# Patient Record
Sex: FEMALE | Race: WHITE | NOT HISPANIC OR LATINO | URBAN - METROPOLITAN AREA
[De-identification: names, ages, dates, MRNs, and addresses within clinical notes are randomized per-mention and may not be internally consistent; named-entity substitution may affect disease eponyms.]

---

## 2018-09-11 ENCOUNTER — EMERGENCY (EMERGENCY)
Facility: HOSPITAL | Age: 83
LOS: 1 days | Discharge: HOME | End: 2018-09-11
Attending: STUDENT IN AN ORGANIZED HEALTH CARE EDUCATION/TRAINING PROGRAM
Payer: MEDICARE

## 2018-09-11 VITALS
OXYGEN SATURATION: 100 % | WEIGHT: 145.95 LBS | RESPIRATION RATE: 17 BRPM | SYSTOLIC BLOOD PRESSURE: 107 MMHG | DIASTOLIC BLOOD PRESSURE: 73 MMHG | TEMPERATURE: 98 F | HEART RATE: 78 BPM | HEIGHT: 66 IN

## 2018-09-11 VITALS
OXYGEN SATURATION: 100 % | TEMPERATURE: 98 F | SYSTOLIC BLOOD PRESSURE: 143 MMHG | DIASTOLIC BLOOD PRESSURE: 70 MMHG | RESPIRATION RATE: 17 BRPM | HEART RATE: 63 BPM

## 2018-09-11 LAB
ALBUMIN SERPL ELPH-MCNC: 3.8 G/DL — SIGNIFICANT CHANGE UP (ref 3.5–5)
ALP SERPL-CCNC: 99 U/L — SIGNIFICANT CHANGE UP (ref 40–120)
ALT FLD-CCNC: 17 U/L DA — SIGNIFICANT CHANGE UP (ref 10–60)
ANION GAP SERPL CALC-SCNC: 9 MMOL/L — SIGNIFICANT CHANGE UP (ref 5–17)
APPEARANCE UR: CLEAR — SIGNIFICANT CHANGE UP
AST SERPL-CCNC: 17 U/L — SIGNIFICANT CHANGE UP (ref 10–40)
BILIRUB SERPL-MCNC: 0.6 MG/DL — SIGNIFICANT CHANGE UP (ref 0.2–1.2)
BILIRUB UR-MCNC: NEGATIVE — SIGNIFICANT CHANGE UP
BUN SERPL-MCNC: 17 MG/DL — SIGNIFICANT CHANGE UP (ref 7–18)
CALCIUM SERPL-MCNC: 9.3 MG/DL — SIGNIFICANT CHANGE UP (ref 8.4–10.5)
CHLORIDE SERPL-SCNC: 107 MMOL/L — SIGNIFICANT CHANGE UP (ref 96–108)
CO2 SERPL-SCNC: 27 MMOL/L — SIGNIFICANT CHANGE UP (ref 22–31)
COLOR SPEC: YELLOW — SIGNIFICANT CHANGE UP
CREAT SERPL-MCNC: 0.92 MG/DL — SIGNIFICANT CHANGE UP (ref 0.5–1.3)
DIFF PNL FLD: ABNORMAL
GLUCOSE SERPL-MCNC: 97 MG/DL — SIGNIFICANT CHANGE UP (ref 70–99)
GLUCOSE UR QL: NEGATIVE — SIGNIFICANT CHANGE UP
HCT VFR BLD CALC: 47.4 % — HIGH (ref 34.5–45)
HGB BLD-MCNC: 15.3 G/DL — SIGNIFICANT CHANGE UP (ref 11.5–15.5)
KETONES UR-MCNC: NEGATIVE — SIGNIFICANT CHANGE UP
LEUKOCYTE ESTERASE UR-ACNC: ABNORMAL
MCHC RBC-ENTMCNC: 28.2 PG — SIGNIFICANT CHANGE UP (ref 27–34)
MCHC RBC-ENTMCNC: 32.4 GM/DL — SIGNIFICANT CHANGE UP (ref 32–36)
MCV RBC AUTO: 87.1 FL — SIGNIFICANT CHANGE UP (ref 80–100)
NITRITE UR-MCNC: NEGATIVE — SIGNIFICANT CHANGE UP
PH UR: 8 — SIGNIFICANT CHANGE UP (ref 5–8)
PLATELET # BLD AUTO: 204 K/UL — SIGNIFICANT CHANGE UP (ref 150–400)
POTASSIUM SERPL-MCNC: 4.2 MMOL/L — SIGNIFICANT CHANGE UP (ref 3.5–5.3)
POTASSIUM SERPL-SCNC: 4.2 MMOL/L — SIGNIFICANT CHANGE UP (ref 3.5–5.3)
PROT SERPL-MCNC: 8 G/DL — SIGNIFICANT CHANGE UP (ref 6–8.3)
PROT UR-MCNC: 100
RBC # BLD: 5.44 M/UL — HIGH (ref 3.8–5.2)
RBC # FLD: 13.2 % — SIGNIFICANT CHANGE UP (ref 10.3–14.5)
SODIUM SERPL-SCNC: 143 MMOL/L — SIGNIFICANT CHANGE UP (ref 135–145)
SP GR SPEC: 1.01 — SIGNIFICANT CHANGE UP (ref 1.01–1.02)
TROPONIN I SERPL-MCNC: <0.015 NG/ML — SIGNIFICANT CHANGE UP (ref 0–0.04)
UROBILINOGEN FLD QL: 1
WBC # BLD: 10.9 K/UL — HIGH (ref 3.8–10.5)
WBC # FLD AUTO: 10.9 K/UL — HIGH (ref 3.8–10.5)

## 2018-09-11 PROCEDURE — 81001 URINALYSIS AUTO W/SCOPE: CPT

## 2018-09-11 PROCEDURE — 99284 EMERGENCY DEPT VISIT MOD MDM: CPT

## 2018-09-11 PROCEDURE — 71046 X-RAY EXAM CHEST 2 VIEWS: CPT

## 2018-09-11 PROCEDURE — 93005 ELECTROCARDIOGRAM TRACING: CPT

## 2018-09-11 PROCEDURE — 80053 COMPREHEN METABOLIC PANEL: CPT

## 2018-09-11 PROCEDURE — 71046 X-RAY EXAM CHEST 2 VIEWS: CPT | Mod: 26

## 2018-09-11 PROCEDURE — 85027 COMPLETE CBC AUTOMATED: CPT

## 2018-09-11 PROCEDURE — 70450 CT HEAD/BRAIN W/O DYE: CPT | Mod: 26

## 2018-09-11 PROCEDURE — 84484 ASSAY OF TROPONIN QUANT: CPT

## 2018-09-11 PROCEDURE — 99284 EMERGENCY DEPT VISIT MOD MDM: CPT | Mod: 25

## 2018-09-11 PROCEDURE — 70450 CT HEAD/BRAIN W/O DYE: CPT

## 2018-09-11 RX ORDER — MECLIZINE HCL 12.5 MG
1 TABLET ORAL
Qty: 15 | Refills: 0 | OUTPATIENT
Start: 2018-09-11 | End: 2018-09-15

## 2018-09-11 RX ORDER — SODIUM CHLORIDE 9 MG/ML
500 INJECTION INTRAMUSCULAR; INTRAVENOUS; SUBCUTANEOUS ONCE
Qty: 0 | Refills: 0 | Status: COMPLETED | OUTPATIENT
Start: 2018-09-11 | End: 2018-09-11

## 2018-09-11 RX ORDER — MECLIZINE HCL 12.5 MG
25 TABLET ORAL ONCE
Qty: 0 | Refills: 0 | Status: COMPLETED | OUTPATIENT
Start: 2018-09-11 | End: 2018-09-11

## 2018-09-11 RX ADMIN — Medication 25 MILLIGRAM(S): at 14:30

## 2018-09-11 RX ADMIN — SODIUM CHLORIDE 500 MILLILITER(S): 9 INJECTION INTRAMUSCULAR; INTRAVENOUS; SUBCUTANEOUS at 14:28

## 2018-09-11 NOTE — ED PROVIDER NOTE - PROGRESS NOTE DETAILS
Patient endorses dizziness resolved. ambulated with assitance of patient's son, per son, ambulation baseline. patient and family informed of lab and imaging finding, endorses feeling comfortable to go gome.

## 2018-09-11 NOTE — ED PROVIDER NOTE - MEDICAL DECISION MAKING DETAILS
89 y.o presenting with lightheadedness. otherwise well ppaeirng. no signs of distress. denies cp, sob. will obtain basic lab, ekg, trop, ct head to r.o acs, lyte abnormality, infection vs ich.   fluid hydration and reassess

## 2018-09-11 NOTE — ED ADULT NURSE NOTE - NSIMPLEMENTINTERV_GEN_ALL_ED
Implemented All Fall with Harm Risk Interventions:  [place] to call system. Call bell, personal items and telephone within reach. Instruct patient to call for assistance. Room bathroom lighting operational. Non-slip footwear when patient is off stretcher. Physically safe environment: no spills, clutter or unnecessary equipment. Stretcher in lowest position, wheels locked, appropriate side rails in place. Provide visual cue, wrist band, yellow gown, etc. Monitor gait and stability. Monitor for mental status changes and reorient to person, place, and time. Review medications for side effects contributing to fall risk. Reinforce activity limits and safety measures with patient and family. Provide visual clues: red socks.

## 2018-09-11 NOTE — ED PROVIDER NOTE - NEUROLOGICAL, MLM
Alert and oriented, no focal deficits, no motor or sensory deficits. moving all extremity. normal heel shin, no ataxia.

## 2018-09-11 NOTE — ED PROVIDER NOTE - OBJECTIVE STATEMENT
dizziness 89 y.o presenting with transient lightheadedness this morning. denies cp, sob, n, v, abd pain, fever, chills, headache. Per patient's son, patient ambulating at baseline, with assistance from him. no trauma or fall.

## 2019-01-20 ENCOUNTER — INPATIENT (INPATIENT)
Facility: HOSPITAL | Age: 84
LOS: 2 days | Discharge: HOME | DRG: 603 | End: 2019-01-23
Attending: INTERNAL MEDICINE | Admitting: INTERNAL MEDICINE
Payer: MEDICARE

## 2019-01-20 VITALS
WEIGHT: 141.98 LBS | HEART RATE: 76 BPM | HEIGHT: 65 IN | RESPIRATION RATE: 16 BRPM | OXYGEN SATURATION: 97 % | DIASTOLIC BLOOD PRESSURE: 75 MMHG | TEMPERATURE: 98 F | SYSTOLIC BLOOD PRESSURE: 120 MMHG

## 2019-01-20 DIAGNOSIS — F03.90 UNSPECIFIED DEMENTIA WITHOUT BEHAVIORAL DISTURBANCE: ICD-10-CM

## 2019-01-20 DIAGNOSIS — N60.09 SOLITARY CYST OF UNSPECIFIED BREAST: Chronic | ICD-10-CM

## 2019-01-20 DIAGNOSIS — Z96.659 PRESENCE OF UNSPECIFIED ARTIFICIAL KNEE JOINT: Chronic | ICD-10-CM

## 2019-01-20 DIAGNOSIS — L03.90 CELLULITIS, UNSPECIFIED: ICD-10-CM

## 2019-01-20 DIAGNOSIS — Z29.9 ENCOUNTER FOR PROPHYLACTIC MEASURES, UNSPECIFIED: ICD-10-CM

## 2019-01-20 LAB
ALBUMIN SERPL ELPH-MCNC: 3.4 G/DL — LOW (ref 3.5–5)
ALP SERPL-CCNC: 99 U/L — SIGNIFICANT CHANGE UP (ref 40–120)
ALT FLD-CCNC: 14 U/L DA — SIGNIFICANT CHANGE UP (ref 10–60)
ANION GAP SERPL CALC-SCNC: 13 MMOL/L — SIGNIFICANT CHANGE UP (ref 5–17)
APTT BLD: 30.2 SEC — SIGNIFICANT CHANGE UP (ref 27.5–36.3)
AST SERPL-CCNC: 20 U/L — SIGNIFICANT CHANGE UP (ref 10–40)
BASOPHILS # BLD AUTO: 0.1 K/UL — SIGNIFICANT CHANGE UP (ref 0–0.2)
BASOPHILS NFR BLD AUTO: 0.9 % — SIGNIFICANT CHANGE UP (ref 0–2)
BILIRUB SERPL-MCNC: 0.5 MG/DL — SIGNIFICANT CHANGE UP (ref 0.2–1.2)
BUN SERPL-MCNC: 21 MG/DL — HIGH (ref 7–18)
CALCIUM SERPL-MCNC: 8.6 MG/DL — SIGNIFICANT CHANGE UP (ref 8.4–10.5)
CHLORIDE SERPL-SCNC: 108 MMOL/L — SIGNIFICANT CHANGE UP (ref 96–108)
CO2 SERPL-SCNC: 22 MMOL/L — SIGNIFICANT CHANGE UP (ref 22–31)
CREAT SERPL-MCNC: 0.91 MG/DL — SIGNIFICANT CHANGE UP (ref 0.5–1.3)
CRP SERPL-MCNC: 0.83 MG/DL — HIGH (ref 0–0.4)
EOSINOPHIL # BLD AUTO: 0.1 K/UL — SIGNIFICANT CHANGE UP (ref 0–0.5)
EOSINOPHIL NFR BLD AUTO: 1.5 % — SIGNIFICANT CHANGE UP (ref 0–6)
ERYTHROCYTE [SEDIMENTATION RATE] IN BLOOD: 21 MM/HR — HIGH (ref 0–20)
GLUCOSE SERPL-MCNC: 90 MG/DL — SIGNIFICANT CHANGE UP (ref 70–99)
HCT VFR BLD CALC: 42.1 % — SIGNIFICANT CHANGE UP (ref 34.5–45)
HGB BLD-MCNC: 13.5 G/DL — SIGNIFICANT CHANGE UP (ref 11.5–15.5)
INR BLD: 1.04 RATIO — SIGNIFICANT CHANGE UP (ref 0.88–1.16)
LACTATE SERPL-SCNC: 1 MMOL/L — SIGNIFICANT CHANGE UP (ref 0.7–2)
LYMPHOCYTES # BLD AUTO: 1.2 K/UL — SIGNIFICANT CHANGE UP (ref 1–3.3)
LYMPHOCYTES # BLD AUTO: 14.2 % — SIGNIFICANT CHANGE UP (ref 13–44)
MCHC RBC-ENTMCNC: 28.5 PG — SIGNIFICANT CHANGE UP (ref 27–34)
MCHC RBC-ENTMCNC: 32.1 GM/DL — SIGNIFICANT CHANGE UP (ref 32–36)
MCV RBC AUTO: 88.8 FL — SIGNIFICANT CHANGE UP (ref 80–100)
MONOCYTES # BLD AUTO: 0.4 K/UL — SIGNIFICANT CHANGE UP (ref 0–0.9)
MONOCYTES NFR BLD AUTO: 5.1 % — SIGNIFICANT CHANGE UP (ref 2–14)
NEUTROPHILS # BLD AUTO: 6.9 K/UL — SIGNIFICANT CHANGE UP (ref 1.8–7.4)
NEUTROPHILS NFR BLD AUTO: 78.3 % — HIGH (ref 43–77)
PLATELET # BLD AUTO: 199 K/UL — SIGNIFICANT CHANGE UP (ref 150–400)
POTASSIUM SERPL-MCNC: 3.9 MMOL/L — SIGNIFICANT CHANGE UP (ref 3.5–5.3)
POTASSIUM SERPL-SCNC: 3.9 MMOL/L — SIGNIFICANT CHANGE UP (ref 3.5–5.3)
PROT SERPL-MCNC: 7.5 G/DL — SIGNIFICANT CHANGE UP (ref 6–8.3)
PROTHROM AB SERPL-ACNC: 11.6 SEC — SIGNIFICANT CHANGE UP (ref 10–12.9)
RBC # BLD: 4.74 M/UL — SIGNIFICANT CHANGE UP (ref 3.8–5.2)
RBC # FLD: 12.9 % — SIGNIFICANT CHANGE UP (ref 10.3–14.5)
SODIUM SERPL-SCNC: 143 MMOL/L — SIGNIFICANT CHANGE UP (ref 135–145)
WBC # BLD: 8.7 K/UL — SIGNIFICANT CHANGE UP (ref 3.8–10.5)
WBC # FLD AUTO: 8.7 K/UL — SIGNIFICANT CHANGE UP (ref 3.8–10.5)

## 2019-01-20 PROCEDURE — 71045 X-RAY EXAM CHEST 1 VIEW: CPT | Mod: 26

## 2019-01-20 PROCEDURE — 99285 EMERGENCY DEPT VISIT HI MDM: CPT

## 2019-01-20 PROCEDURE — 73630 X-RAY EXAM OF FOOT: CPT | Mod: 26,RT

## 2019-01-20 RX ORDER — PIPERACILLIN AND TAZOBACTAM 4; .5 G/20ML; G/20ML
3.38 INJECTION, POWDER, LYOPHILIZED, FOR SOLUTION INTRAVENOUS ONCE
Qty: 0 | Refills: 0 | Status: COMPLETED | OUTPATIENT
Start: 2019-01-20 | End: 2019-01-20

## 2019-01-20 RX ORDER — SODIUM CHLORIDE 9 MG/ML
1000 INJECTION INTRAMUSCULAR; INTRAVENOUS; SUBCUTANEOUS
Qty: 0 | Refills: 0 | Status: DISCONTINUED | OUTPATIENT
Start: 2019-01-20 | End: 2019-01-21

## 2019-01-20 RX ORDER — AMPICILLIN SODIUM AND SULBACTAM SODIUM 250; 125 MG/ML; MG/ML
3 INJECTION, POWDER, FOR SUSPENSION INTRAMUSCULAR; INTRAVENOUS EVERY 6 HOURS
Qty: 0 | Refills: 0 | Status: DISCONTINUED | OUTPATIENT
Start: 2019-01-20 | End: 2019-01-23

## 2019-01-20 RX ORDER — ENOXAPARIN SODIUM 100 MG/ML
40 INJECTION SUBCUTANEOUS DAILY
Qty: 0 | Refills: 0 | Status: DISCONTINUED | OUTPATIENT
Start: 2019-01-20 | End: 2019-01-23

## 2019-01-20 RX ADMIN — PIPERACILLIN AND TAZOBACTAM 3.38 GRAM(S): 4; .5 INJECTION, POWDER, LYOPHILIZED, FOR SOLUTION INTRAVENOUS at 19:11

## 2019-01-20 RX ADMIN — PIPERACILLIN AND TAZOBACTAM 200 GRAM(S): 4; .5 INJECTION, POWDER, LYOPHILIZED, FOR SOLUTION INTRAVENOUS at 18:06

## 2019-01-20 RX ADMIN — SODIUM CHLORIDE 70 MILLILITER(S): 9 INJECTION INTRAMUSCULAR; INTRAVENOUS; SUBCUTANEOUS at 23:25

## 2019-01-20 NOTE — H&P ADULT - ASSESSMENT
89 female with PMH of Dementia , HTN , comes in with complaint of increased redness and swelling of right 5th toe. Patient started having these symptoms on friday, and then it kept increasing. Went to her podiatrist and was then was sent on here as her toe started turning black. Denies any pain in toe. No fever , chills , nausea , vomiting , diarrhea or any other ROS. Never had similar symptoms before.   In Ed , BP : 120/ 75 mm hg , HR: 76 , Temp : 98.5 F  Cbc WNL, with left shift , ESR : 21   Cmp WNL   Lactate WNL  Xray negative    Will admit to medicine for Cellulitis. 89 female with PMH of Dementia , HTN ( not on meds) , comes in with complaint of increased redness and swelling of right 5th toe. Patient started having these symptoms on friday, and then it kept increasing. Went to her podiatrist and was then was sent on here as her toe started turning black. Denies any pain in toe. No fever , chills , nausea , vomiting , diarrhea or any other ROS. Never had similar symptoms before.   In Ed , BP : 120/ 75 mm hg , HR: 76 , Temp : 98.5 F  Cbc WNL, with left shift , ESR : 21   Cmp WNL   Lactate WNL  Xray negative    Will admit to medicine for Cellulitis. 89 female with PMH of Dementia , HTN ( not on meds) , comes in with complaint of increased redness and swelling of right 4th toe. Patient started having these symptoms on friday, and then it kept increasing. Went to her podiatrist and was then was sent on here as her toe started turning black. Denies any pain in toe. No fever , chills , nausea , vomiting , diarrhea or any other ROS. Never had similar symptoms before.   In Ed , BP : 120/ 75 mm hg , HR: 76 , Temp : 98.5 F  Cbc WNL, with left shift , ESR : 21   Cmp WNL   Lactate WNL  Xray negative    Will admit to medicine for Cellulitis.

## 2019-01-20 NOTE — H&P ADULT - PROBLEM SELECTOR PLAN 3
IMPROVE VTE Individual Risk Assessment          RISK                                                          Points  [  ] Previous VTE                                                3  [  ] Thrombophilia                                             2  [  ] Lower limb paralysis                                   2        (unable to hold up >15 seconds)    [  ] Current Cancer                                             2         (within 6 months)  [ x ] Immobilization > 24 hrs                              1  [  ] ICU/CCU stay > 24 hours                             1  [  x] Age > 60                                                         1    IMPROVE VTE Score: 2   Levonox for dvt ppx

## 2019-01-20 NOTE — H&P ADULT - HISTORY OF PRESENT ILLNESS
89 female with PMH of Dementia , HTN , comes in with complaint of increased redness and swelling of right 5th toe. Patient started having these symptoms on friday, and then it kept increasing. Went to her podiatrist and was then was sent on here as her toe started turning black. Denies any pain in toe. No fever , chills , nausea , vomiting , diarrhea or any other ROS. Never had similar symptoms before.     In Ed , BP : 120/ 75 mm hg , HR: 76 , Temp : 98.5 F  Cbc WNL, with left shift , ESR : 21   Cmp WNL   Lactate WNL  Xray negative 89 female with PMH of Dementia , HTN , comes in with complaint of increased redness and swelling of right 4th toe. Patient started having these symptoms on friday, and then it kept increasing. Went to her podiatrist and was then was sent on here as her toe started turning black. Denies any pain in toe. No fever , chills , nausea , vomiting , diarrhea or any other ROS. Never had similar symptoms before.     In Ed , BP : 120/ 75 mm hg , HR: 76 , Temp : 98.5 F  Cbc WNL, with left shift , ESR : 21   Cmp WNL   Lactate WNL  Xray negative

## 2019-01-20 NOTE — H&P ADULT - NSHPPHYSICALEXAM_GEN_ALL_CORE
PHYSICAL EXAM:  GENERAL: NAD  HEENT: Normocephalic;  conjunctivae and sclerae clear; dry mucous membranes;   NECK : supple  CHEST/LUNG: Clear to auscultation bilaterally with good air entry   HEART: S1 S2  regular; no murmurs, gallops or rubs  ABDOMEN: Soft, Nontender, Nondistended; Bowel sounds present  EXTREMITIES: no cyanosis; right 4th toe redness , swelling , warmth,  necrosis+; pulses intact  SKIN: warm and dry; no rash  NERVOUS SYSTEM:  Awake and alert; Oriented  to place, person and time ; no new deficits

## 2019-01-20 NOTE — ED PROVIDER NOTE - CALF TENDERNESS
suncentimeter area of necrosis to 4th digit PIP of right foot, surronding erythema, induration, warmth, extremities are warm

## 2019-01-20 NOTE — H&P ADULT - PROBLEM SELECTOR PLAN 1
Comes in with complaints of right redness , swelling , necrosis Comes in with complaints of right toe redness , swelling , necrosis , likely traumatic   Would start IV abx  IVF   Podiatry consult   Will likely need GIA  Will check a1c , Vitamin b12   ESR , CRP not very elevated , less likely Osteo  Xray negative for Osteo  If no improvement , consider MRI

## 2019-01-20 NOTE — ED ADULT NURSE NOTE - NSIMPLEMENTINTERV_GEN_ALL_ED
Implemented All Fall with Harm Risk Interventions:  [place] to call system. Call bell, personal items and telephone within reach. Instruct patient to call for assistance. Room bathroom lighting operational. Non-slip footwear when patient is off stretcher. Physically safe environment: no spills, clutter or unnecessary equipment. Stretcher in lowest position, wheels locked, appropriate side rails in place. Provide visual cue, wrist band, yellow gown, etc. Monitor gait and stability. Monitor for mental status changes and reorient to person, place, and time. Review medications for side effects contributing to fall risk. Reinforce activity limits and safety measures with patient and family. Provide visual clues: red socks.

## 2019-01-20 NOTE — ED PROVIDER NOTE - OBJECTIVE STATEMENT
89 y/o F pt w/ PMHx of HTN, Dementia presents to ED c/o redness, swelling, right foot pain x today. Pt  contact Dr. Pink, and was told to go to hospital. Pt denies trauma. History limited due to dementia. 89 y/o F pt w/ PMHx of HTN, Dementia presents to ED c/o redness, swelling, right foot pain x today. Pt  contacted her podiatrist, and was told to go to hospital. Pt denies trauma. History limited due to dementia.

## 2019-01-20 NOTE — H&P ADULT - NSHPLABSRESULTS_GEN_ALL_CORE
13.5   8.7   )-----------( 199      ( 20 Jan 2019 16:59 )             42.1       01-20    143  |  108  |  21<H>  ----------------------------<  90  3.9   |  22  |  0.91    Ca    8.6      20 Jan 2019 17:29    TPro  7.5  /  Alb  3.4<L>  /  TBili  0.5  /  DBili  x   /  AST  20  /  ALT  14  /  AlkPhos  99  01-20      < from: Xray Foot AP + Lateral + Oblique, Right (01.20.19 @ 17:26) >      HISTORY: Infection      3 views of the right foot show no evidence of acute fracture or cortical   erosion. The jointspaces show hammertoe deformities. Vascular   calcifications are present.    IMPRESSION: No acute bony pathology.    Note - This does not exclude fractures in perfect alignment and   apposition as presence may be indicated at one to three weeks following   callus formation.    Thank you for this referral.    < end of copied text >

## 2019-01-21 LAB
24R-OH-CALCIDIOL SERPL-MCNC: 31.4 NG/ML — SIGNIFICANT CHANGE UP (ref 30–80)
ANION GAP SERPL CALC-SCNC: 10 MMOL/L — SIGNIFICANT CHANGE UP (ref 5–17)
APPEARANCE UR: CLEAR — SIGNIFICANT CHANGE UP
BACTERIA # UR AUTO: ABNORMAL /HPF
BASOPHILS # BLD AUTO: 0.1 K/UL — SIGNIFICANT CHANGE UP (ref 0–0.2)
BASOPHILS NFR BLD AUTO: 1 % — SIGNIFICANT CHANGE UP (ref 0–2)
BILIRUB UR-MCNC: NEGATIVE — SIGNIFICANT CHANGE UP
BUN SERPL-MCNC: 22 MG/DL — HIGH (ref 7–18)
CALCIUM SERPL-MCNC: 8.5 MG/DL — SIGNIFICANT CHANGE UP (ref 8.4–10.5)
CHLORIDE SERPL-SCNC: 109 MMOL/L — HIGH (ref 96–108)
CHOLEST SERPL-MCNC: 190 MG/DL — SIGNIFICANT CHANGE UP (ref 10–199)
CO2 SERPL-SCNC: 26 MMOL/L — SIGNIFICANT CHANGE UP (ref 22–31)
COLOR SPEC: YELLOW — SIGNIFICANT CHANGE UP
COMMENT - URINE: SIGNIFICANT CHANGE UP
CREAT SERPL-MCNC: 0.93 MG/DL — SIGNIFICANT CHANGE UP (ref 0.5–1.3)
DIFF PNL FLD: NEGATIVE — SIGNIFICANT CHANGE UP
EOSINOPHIL # BLD AUTO: 0.2 K/UL — SIGNIFICANT CHANGE UP (ref 0–0.5)
EOSINOPHIL NFR BLD AUTO: 1.9 % — SIGNIFICANT CHANGE UP (ref 0–6)
EPI CELLS # UR: SIGNIFICANT CHANGE UP /HPF
FOLATE SERPL-MCNC: 18.5 NG/ML — SIGNIFICANT CHANGE UP
GLUCOSE SERPL-MCNC: 92 MG/DL — SIGNIFICANT CHANGE UP (ref 70–99)
GLUCOSE UR QL: NEGATIVE — SIGNIFICANT CHANGE UP
HBA1C BLD-MCNC: 5.3 % — SIGNIFICANT CHANGE UP (ref 4–5.6)
HCT VFR BLD CALC: 42.5 % — SIGNIFICANT CHANGE UP (ref 34.5–45)
HDLC SERPL-MCNC: 46 MG/DL — LOW
HGB BLD-MCNC: 13.8 G/DL — SIGNIFICANT CHANGE UP (ref 11.5–15.5)
KETONES UR-MCNC: NEGATIVE — SIGNIFICANT CHANGE UP
LEUKOCYTE ESTERASE UR-ACNC: ABNORMAL
LIPID PNL WITH DIRECT LDL SERPL: 120 MG/DL — SIGNIFICANT CHANGE UP
LYMPHOCYTES # BLD AUTO: 1.3 K/UL — SIGNIFICANT CHANGE UP (ref 1–3.3)
LYMPHOCYTES # BLD AUTO: 15.8 % — SIGNIFICANT CHANGE UP (ref 13–44)
MAGNESIUM SERPL-MCNC: 2 MG/DL — SIGNIFICANT CHANGE UP (ref 1.6–2.6)
MCHC RBC-ENTMCNC: 28.8 PG — SIGNIFICANT CHANGE UP (ref 27–34)
MCHC RBC-ENTMCNC: 32.3 GM/DL — SIGNIFICANT CHANGE UP (ref 32–36)
MCV RBC AUTO: 89 FL — SIGNIFICANT CHANGE UP (ref 80–100)
MONOCYTES # BLD AUTO: 0.5 K/UL — SIGNIFICANT CHANGE UP (ref 0–0.9)
MONOCYTES NFR BLD AUTO: 5.9 % — SIGNIFICANT CHANGE UP (ref 2–14)
NEUTROPHILS # BLD AUTO: 6.2 K/UL — SIGNIFICANT CHANGE UP (ref 1.8–7.4)
NEUTROPHILS NFR BLD AUTO: 75.5 % — SIGNIFICANT CHANGE UP (ref 43–77)
NITRITE UR-MCNC: NEGATIVE — SIGNIFICANT CHANGE UP
PH UR: 7 — SIGNIFICANT CHANGE UP (ref 5–8)
PHOSPHATE SERPL-MCNC: 3.3 MG/DL — SIGNIFICANT CHANGE UP (ref 2.5–4.5)
PLATELET # BLD AUTO: 211 K/UL — SIGNIFICANT CHANGE UP (ref 150–400)
POTASSIUM SERPL-MCNC: 4 MMOL/L — SIGNIFICANT CHANGE UP (ref 3.5–5.3)
POTASSIUM SERPL-SCNC: 4 MMOL/L — SIGNIFICANT CHANGE UP (ref 3.5–5.3)
PROT UR-MCNC: NEGATIVE — SIGNIFICANT CHANGE UP
RBC # BLD: 4.78 M/UL — SIGNIFICANT CHANGE UP (ref 3.8–5.2)
RBC # FLD: 13.1 % — SIGNIFICANT CHANGE UP (ref 10.3–14.5)
RBC CASTS # UR COMP ASSIST: SIGNIFICANT CHANGE UP /HPF (ref 0–2)
SODIUM SERPL-SCNC: 145 MMOL/L — SIGNIFICANT CHANGE UP (ref 135–145)
SP GR SPEC: 1.01 — SIGNIFICANT CHANGE UP (ref 1.01–1.02)
TOTAL CHOLESTEROL/HDL RATIO MEASUREMENT: 4.1 RATIO — SIGNIFICANT CHANGE UP (ref 3.3–7.1)
TRIGL SERPL-MCNC: 121 MG/DL — SIGNIFICANT CHANGE UP (ref 10–149)
TSH SERPL-MCNC: 2.7 UU/ML — SIGNIFICANT CHANGE UP (ref 0.34–4.82)
UROBILINOGEN FLD QL: NEGATIVE — SIGNIFICANT CHANGE UP
VIT B12 SERPL-MCNC: 282 PG/ML — SIGNIFICANT CHANGE UP (ref 232–1245)
WBC # BLD: 8.2 K/UL — SIGNIFICANT CHANGE UP (ref 3.8–10.5)
WBC # FLD AUTO: 8.2 K/UL — SIGNIFICANT CHANGE UP (ref 3.8–10.5)
WBC UR QL: ABNORMAL /HPF (ref 0–5)

## 2019-01-21 RX ORDER — LANOLIN ALCOHOL/MO/W.PET/CERES
5 CREAM (GRAM) TOPICAL ONCE
Qty: 0 | Refills: 0 | Status: COMPLETED | OUTPATIENT
Start: 2019-01-21 | End: 2019-01-21

## 2019-01-21 RX ORDER — ACETAMINOPHEN 500 MG
650 TABLET ORAL EVERY 6 HOURS
Qty: 0 | Refills: 0 | Status: DISCONTINUED | OUTPATIENT
Start: 2019-01-21 | End: 2019-01-23

## 2019-01-21 RX ADMIN — AMPICILLIN SODIUM AND SULBACTAM SODIUM 200 GRAM(S): 250; 125 INJECTION, POWDER, FOR SUSPENSION INTRAMUSCULAR; INTRAVENOUS at 01:56

## 2019-01-21 RX ADMIN — AMPICILLIN SODIUM AND SULBACTAM SODIUM 200 GRAM(S): 250; 125 INJECTION, POWDER, FOR SUSPENSION INTRAMUSCULAR; INTRAVENOUS at 12:19

## 2019-01-21 RX ADMIN — AMPICILLIN SODIUM AND SULBACTAM SODIUM 200 GRAM(S): 250; 125 INJECTION, POWDER, FOR SUSPENSION INTRAMUSCULAR; INTRAVENOUS at 18:21

## 2019-01-21 RX ADMIN — ENOXAPARIN SODIUM 40 MILLIGRAM(S): 100 INJECTION SUBCUTANEOUS at 12:20

## 2019-01-21 RX ADMIN — AMPICILLIN SODIUM AND SULBACTAM SODIUM 200 GRAM(S): 250; 125 INJECTION, POWDER, FOR SUSPENSION INTRAMUSCULAR; INTRAVENOUS at 07:35

## 2019-01-21 RX ADMIN — Medication 5 MILLIGRAM(S): at 23:41

## 2019-01-21 RX ADMIN — Medication 650 MILLIGRAM(S): at 23:48

## 2019-01-21 NOTE — PROGRESS NOTE ADULT - ASSESSMENT
89 female with PMH of Dementia , HTN ( not on meds) , comes in with complaint of increased redness and swelling of right 4th toe. Patient started having these symptoms on friday, and then it kept increasing. Went to her podiatrist and was then was sent on here as her toe started turning black. Denies any pain in toe. No fever , chills , nausea , vomiting , diarrhea or any other ROS. Never had similar symptoms before.   In Ed , BP : 120/ 75 mm hg , HR: 76 , Temp : 98.5 F  Cbc WNL, with left shift , ESR : 21   Cmp WNL   Lactate WNL  Xray negative    Will admit to medicine for Cellulitis. 89 female with PMH of Dementia , HTN ( not on meds) , comes in with complaint of increased redness and swelling of right 4th toe.

## 2019-01-21 NOTE — PHYSICAL THERAPY INITIAL EVALUATION ADULT - PERTINENT HX OF CURRENT PROBLEM, REHAB EVAL
Pt. was sent to ED by podiatrist and she  comes in with complaint of increased redness and swelling of right 4th toe. Was sent to ED as her toe was getting "black".

## 2019-01-21 NOTE — CONSULT NOTE ADULT - PROBLEM SELECTOR RECOMMENDATION 9
Pt evaluated and chart reviewed  Recommend continuation of IV antibiotics per ID recommendation  X-ray evaluated - no radiographic signs of bone infection  GIA ordered to flow to the feet   Following GIA will most likely need vascular consult   No dressing applied to the right foot to monitor the cellulitis   Podiatry to follow while patient is admitted  Discussed with attending Dr. Kaba

## 2019-01-21 NOTE — PROGRESS NOTE ADULT - SUBJECTIVE AND OBJECTIVE BOX
Patient is a 89y old  Female who presents with a chief complaint of cellulitis (2019 10:47)      OVERNIGHT EVENTS:  Patient seen and examined at bedside. Denies chest pain, palpitation, SOB, nausea, vomiting, abdominal pain.    T(C): 36.9 (19 @ 05:57), Max: 37 (19 @ 23:30)  HR: 90 (19 @ 09:57) (67 - 90)  BP: 127/59 (19 @ 09:57) (120/75 - 161/67)  RR: 18 (19 @ 05:57) (16 - 18)  SpO2: 100% (19 @ 09:57) (95% - 100%)  Wt(kg): --  I&O's Summary        REVIEW OF SYSTEMS:  No fever,   No cough, SOB  No chest pain, palpitations  No Abd pain, nausea, vomiting, No diarrhea or constipation      PHYSICAL EXAM:    A X O x 2  EYES: EOMI, PERRLA,  NECK: Supple, No JVD, Normal thyroid  Resp: CTAB, No crackles, wheezing,   CVS: Regular rate and rhythm; No murmurs, rubs, or gallops  ABD: Soft, Nontender, Nondistended; Bowel sounds present  EXTREMITIES:  2+ Peripheral Pulses, No edema    LABS:                        13.8   8.2   )-----------( 211      ( 2019 05:49 )             42.5         145  |  109<H>  |  22<H>  ----------------------------<  92  4.0   |  26  |  0.93    Ca    8.5      2019 05:49  Phos  3.3       Mg     2.0         TPro  7.5  /  Alb  3.4<L>  /  TBili  0.5  /  DBili  x   /  AST  20  /  ALT  14  /  AlkPhos  99      PT/INR - ( 2019 21:50 )   PT: 11.6 sec;   INR: 1.04 ratio         PTT - ( 2019 21:50 )  PTT:30.2 sec  Urinalysis Basic - ( 2019 00:29 )    Color: Yellow / Appearance: Clear / S.010 / pH: x  Gluc: x / Ketone: Negative  / Bili: Negative / Urobili: Negative   Blood: x / Protein: Negative / Nitrite: Negative   Leuk Esterase: Small / RBC: 0-2 /HPF / WBC 6-10 /HPF   Sq Epi: x / Non Sq Epi: Few /HPF / Bacteria: Few /HPF      CAPILLARY BLOOD GLUCOSE            Urinalysis Basic - ( 2019 00:29 )    Color: Yellow / Appearance: Clear / S.010 / pH: x  Gluc: x / Ketone: Negative  / Bili: Negative / Urobili: Negative   Blood: x / Protein: Negative / Nitrite: Negative   Leuk Esterase: Small / RBC: 0-2 /HPF / WBC 6-10 /HPF   Sq Epi: x / Non Sq Epi: Few /HPF / Bacteria: Few /HPF        Radiology:

## 2019-01-21 NOTE — PHYSICAL THERAPY INITIAL EVALUATION ADULT - IMPAIRMENTS FOUND, PT EVAL
aerobic capacity/endurance/gross motor/muscle strength/cognitive impairment/gait, locomotion, and balance

## 2019-01-21 NOTE — CONSULT NOTE ADULT - SUBJECTIVE AND OBJECTIVE BOX
Patient is a 89y old  Female who presents with a chief complaint of cellulitis  (20 Jan 2019 21:33)      HPI:  89 female with PMH of Dementia , HTN , comes in with complaint of increased redness and swelling of right 4th toe. Patient started having these symptoms on friday, and then it kept increasing. Went to her podiatrist and was then was sent on here as her toe started turning black. Denies any pain in toe. No fever , chills , nausea , vomiting , diarrhea or any other ROS. Never had similar symptoms before.     Pt seen bedside by Podiatry in NAD, AAO x 3. Pt admits to pain at the cellulitic site. Pt states it has been getting worse since the weekend. Pt denies any other pedal complaints. Pt denies any current F/V/N/C/SOB. Pt denies any burning numbness, or tingling.     In Ed , BP : 120/ 75 mm hg , HR: 76 , Temp : 98.5 F  Cbc WNL, with left shift , ESR : 21   Cmp WNL   Lactate WNL  Xray negative (20 Jan 2019 21:33)      PMH:Dementia  HTN (hypertension)    Allergies: No Known Allergies    Medications: ampicillin/sulbactam  IVPB 3 Gram(s) IV Intermittent every 6 hours  enoxaparin Injectable 40 milliGRAM(s) SubCutaneous daily  sodium chloride 0.9%. 1000 milliLiter(s) IV Continuous <Continuous>    FH:No pertinent family history in first degree relatives    PSX: Benign breast cyst in female  S/P knee replacement  No significant past surgical history    SH: ampicillin/sulbactam  IVPB 3 Gram(s) IV Intermittent every 6 hours  enoxaparin Injectable 40 milliGRAM(s) SubCutaneous daily  sodium chloride 0.9%. 1000 milliLiter(s) IV Continuous <Continuous>      Vital Signs Last 24 Hrs  T(C): 36.9 (21 Jan 2019 05:57), Max: 37 (20 Jan 2019 23:30)  T(F): 98.5 (21 Jan 2019 05:57), Max: 98.6 (20 Jan 2019 23:30)  HR: 90 (21 Jan 2019 09:57) (67 - 90)  BP: 127/59 (21 Jan 2019 09:57) (120/75 - 161/67)  BP(mean): --  RR: 18 (21 Jan 2019 05:57) (16 - 18)  SpO2: 100% (21 Jan 2019 09:57) (95% - 100%)    LABS:                       13.8   8.2   )-----------( 211      ( 21 Jan 2019 05:49 )             42.5               01-21    145  |  109<H>  |  22<H>  ----------------------------<  92  4.0   |  26  |  0.93    Ca    8.5      21 Jan 2019 05:49  Phos  3.3     01-21  Mg     2.0     01-21    TPro  7.5  /  Alb  3.4<L>  /  TBili  0.5  /  DBili  x   /  AST  20  /  ALT  14  /  AlkPhos  99  01-20      ROS  REVIEW OF SYSTEMS:    CONSTITUTIONAL: No fever, weight loss, or fatigue  EYES: No eye pain, visual disturbances, or discharge  ENMT:  No difficulty hearing, tinnitus, vertigo; No sinus or throat pain  NECK: No pain or stiffness  BREASTS: No pain, masses, or nipple discharge  RESPIRATORY: No cough, wheezing, chills or hemoptysis; No shortness of breath  CARDIOVASCULAR: No chest pain, palpitations, dizziness, or leg swelling  GASTROINTESTINAL: No abdominal or epigastric pain. No nausea, vomiting, or hematemesis; No diarrhea or constipation. No melena or hematochezia.  GENITOURINARY: No dysuria, frequency, hematuria, or incontinence  NEUROLOGICAL: No headaches, memory loss, loss of strength, numbness, or tremors  SKIN: No itching, burning, rashes, or lesions   LYMPH NODES: No enlarged glands  ENDOCRINE: No heat or cold intolerance; No hair loss  MUSCULOSKELETAL: No joint pain or swelling; No muscle, back, or extremity pain  PSYCHIATRIC: No depression, anxiety, mood swings, or difficulty sleeping  HEME/LYMPH: No easy bruising, or bleeding gums  ALLERY AND IMMUNOLOGIC: No hives or eczema      PHYSICAL EXAM  GEN: AUGUSTO READ is a pleasant well-nourished, well developed 89y Female in no acute distress, alert awake, and oriented to person, place and time.   LE Focused:    Vasc:  DP/PT nonpalpable; CFT < 3 sec x 10; TG WNL; significant erythema noted to the right fourth digit with erythema tracking proximally to the dorsum of right foot  Derm: Necrotic ulceration noted to the IPJ of the right fourth digit; erythema noted to the fourth right digit tracking proximally to the dorsum of the right foot; no malodor; no drainage; no crepitance or fluctuation; (-) PTB; pain on palpation of the digit   Neuro: Protective sensation grossly diminished     Imaging:   < from: Xray Foot AP + Lateral + Oblique, Right (01.20.19 @ 17:26) >  EXAM:  FOOT RIGHT (MINIMUM 3 VIEWS)                            PROCEDURE DATE:  01/20/2019          INTERPRETATION:  Right foot    HISTORY: Infection      3 views of the right foot show no evidence of acute fracture or cortical   erosion. The jointspaces show hammertoe deformities. Vascular   calcifications are present.    IMPRESSION: No acute bony pathology.    Note - This does not exclude fractures in perfect alignment and   apposition as presence may be indicated at one to three weeks following   callus formation.

## 2019-01-21 NOTE — PHYSICAL THERAPY INITIAL EVALUATION ADULT - GENERAL OBSERVATIONS, REHAB EVAL
Pt. found lying supine in NAD; on 1:1 supervision. Pt. is confused but was able to follow all verbal commands and was cooperative during eval.

## 2019-01-21 NOTE — CONSULT NOTE ADULT - ASSESSMENT
89 female with PMH of Dementia , HTN , comes in with complaint of increased redness and swelling of right 4th toe.

## 2019-01-21 NOTE — PHYSICAL THERAPY INITIAL EVALUATION ADULT - CRITERIA FOR SKILLED THERAPEUTIC INTERVENTIONS
risk reduction/prevention/rehab potential/functional limitations in following categories/predicted duration of therapy intervention/impairments found/therapy frequency/anticipated discharge recommendation

## 2019-01-22 LAB
ANION GAP SERPL CALC-SCNC: 8 MMOL/L — SIGNIFICANT CHANGE UP (ref 5–17)
BUN SERPL-MCNC: 18 MG/DL — SIGNIFICANT CHANGE UP (ref 7–18)
CALCIUM SERPL-MCNC: 8.8 MG/DL — SIGNIFICANT CHANGE UP (ref 8.4–10.5)
CHLORIDE SERPL-SCNC: 109 MMOL/L — HIGH (ref 96–108)
CO2 SERPL-SCNC: 25 MMOL/L — SIGNIFICANT CHANGE UP (ref 22–31)
CREAT SERPL-MCNC: 0.94 MG/DL — SIGNIFICANT CHANGE UP (ref 0.5–1.3)
CULTURE RESULTS: NO GROWTH — SIGNIFICANT CHANGE UP
GLUCOSE SERPL-MCNC: 105 MG/DL — HIGH (ref 70–99)
HCT VFR BLD CALC: 40.8 % — SIGNIFICANT CHANGE UP (ref 34.5–45)
HGB BLD-MCNC: 13.6 G/DL — SIGNIFICANT CHANGE UP (ref 11.5–15.5)
MCHC RBC-ENTMCNC: 29 PG — SIGNIFICANT CHANGE UP (ref 27–34)
MCHC RBC-ENTMCNC: 33.2 GM/DL — SIGNIFICANT CHANGE UP (ref 32–36)
MCV RBC AUTO: 87.2 FL — SIGNIFICANT CHANGE UP (ref 80–100)
PLATELET # BLD AUTO: 207 K/UL — SIGNIFICANT CHANGE UP (ref 150–400)
POTASSIUM SERPL-MCNC: 4 MMOL/L — SIGNIFICANT CHANGE UP (ref 3.5–5.3)
POTASSIUM SERPL-SCNC: 4 MMOL/L — SIGNIFICANT CHANGE UP (ref 3.5–5.3)
RBC # BLD: 4.68 M/UL — SIGNIFICANT CHANGE UP (ref 3.8–5.2)
RBC # FLD: 13.1 % — SIGNIFICANT CHANGE UP (ref 10.3–14.5)
SODIUM SERPL-SCNC: 142 MMOL/L — SIGNIFICANT CHANGE UP (ref 135–145)
SPECIMEN SOURCE: SIGNIFICANT CHANGE UP
WBC # BLD: 6.8 K/UL — SIGNIFICANT CHANGE UP (ref 3.8–10.5)
WBC # FLD AUTO: 6.8 K/UL — SIGNIFICANT CHANGE UP (ref 3.8–10.5)

## 2019-01-22 PROCEDURE — 93923 UPR/LXTR ART STDY 3+ LVLS: CPT | Mod: 26

## 2019-01-22 RX ADMIN — Medication 650 MILLIGRAM(S): at 00:30

## 2019-01-22 RX ADMIN — AMPICILLIN SODIUM AND SULBACTAM SODIUM 200 GRAM(S): 250; 125 INJECTION, POWDER, FOR SUSPENSION INTRAMUSCULAR; INTRAVENOUS at 13:02

## 2019-01-22 RX ADMIN — AMPICILLIN SODIUM AND SULBACTAM SODIUM 200 GRAM(S): 250; 125 INJECTION, POWDER, FOR SUSPENSION INTRAMUSCULAR; INTRAVENOUS at 05:01

## 2019-01-22 RX ADMIN — AMPICILLIN SODIUM AND SULBACTAM SODIUM 200 GRAM(S): 250; 125 INJECTION, POWDER, FOR SUSPENSION INTRAMUSCULAR; INTRAVENOUS at 00:10

## 2019-01-22 RX ADMIN — AMPICILLIN SODIUM AND SULBACTAM SODIUM 200 GRAM(S): 250; 125 INJECTION, POWDER, FOR SUSPENSION INTRAMUSCULAR; INTRAVENOUS at 18:34

## 2019-01-22 RX ADMIN — ENOXAPARIN SODIUM 40 MILLIGRAM(S): 100 INJECTION SUBCUTANEOUS at 13:02

## 2019-01-22 NOTE — PROGRESS NOTE ADULT - PROBLEM SELECTOR PLAN 1
Pt evaluated and chart reviewed  Recommend continuation of IV antibiotics per ID recommendation  X-ray evaluated - no radiographic signs of bone infection  GIA ordered to flow to the feet - pending  Following GIA will most likely need vascular consult   No dressing applied to the right foot to monitor the cellulitis   Podiatry to follow while patient is admitted  Discussed with attending Dr. Kaba. Pt evaluated and chart reviewed  Recommend continuation of IV antibiotics per ID recommendation  X-ray evaluated - no radiographic signs of bone infection  GIA ordered to evaluate blood flow to the feet - pending  Following GIA will most likely need vascular consult   No dressing applied to the right foot to monitor the cellulitis   Podiatry to follow while patient is admitted  Discussed with attending Dr. Kaba.

## 2019-01-22 NOTE — PROGRESS NOTE ADULT - ASSESSMENT
89 female with PMH of Dementia , HTN ( not on meds) , comes in with complaint of increased redness and swelling of right 4th toe.

## 2019-01-22 NOTE — PROGRESS NOTE ADULT - PROBLEM SELECTOR PLAN 1
Comes in with complaints of right toe redness, swelling, necrosis , likely traumatic   Xray negative for Osteo  Awaiting GIA's  Continue Unasyn  -Podiatry:

## 2019-01-22 NOTE — PROGRESS NOTE ADULT - SUBJECTIVE AND OBJECTIVE BOX
Patient is a 89y old  Female who presents with a chief complaint of cellulitis (2019 10:47)      OVERNIGHT EVENTS:  No acute events reported overnight.    Today pt presents in no acute distress.  Pt found resting comfortably in bed.      T(C): 36.9 (19 @ 05:57), Max: 37 (19 @ 23:30)  HR: 90 (19 @ 09:57) (67 - 90)  BP: 127/59 (19 @ 09:57) (120/75 - 161/67)  RR: 18 (19 @ 05:57) (16 - 18)  SpO2: 100% (19 @ 09:57) (95% - 100%)      REVIEW OF SYSTEMS:  No fever,   No cough, SOB  No chest pain, palpitations  No Abd pain, nausea, vomiting, No diarrhea or constipation      PHYSICAL EXAM:    A X O x 2  EYES: EOMI, PERRLA,  NECK: Supple, No JVD, Normal thyroid  Resp: CTAB, No crackles, wheezing,   CVS: Regular rate and rhythm; No murmurs, rubs, or gallops  ABD: Soft, Nontender, Nondistended; Bowel sounds present  EXTREMITIES:  2+ Peripheral Pulses, No edema    LABS:        Color: Yellow / Appearance: Clear / S.010 / pH: x  Gluc: x / Ketone: Negative  / Bili: Negative / Urobili: Negative   Blood: x / Protein: Negative / Nitrite: Negative   Leuk Esterase: Small / RBC: 0-2 /HPF / WBC 6-10 /HPF   Sq Epi: x / Non Sq Epi: Few /HPF / Bacteria: Few /HPF      CAPILLARY BLOOD GLUCOSE            Urinalysis Basic - ( 2019 00:29 )    Color: Yellow / Appearance: Clear / S.010 / pH: x  Gluc: x / Ketone: Negative  / Bili: Negative / Urobili: Negative   Blood: x / Protein: Negative / Nitrite: Negative   Leuk Esterase: Small / RBC: 0-2 /HPF / WBC 6-10 /HPF   Sq Epi: x / Non Sq Epi: Few /HPF / Bacteria: Few /HPF        Radiology:

## 2019-01-22 NOTE — PROGRESS NOTE ADULT - SUBJECTIVE AND OBJECTIVE BOX
Patient is a 89y old  Female who presents with a chief complaint of cellulitis  (20 Jan 2019 21:33)      HPI:  89 female with PMH of Dementia , HTN , comes in with complaint of increased redness and swelling of right 4th toe. Patient started having these symptoms on friday, and then it kept increasing. Went to her podiatrist and was then was sent on here as her toe started turning black. Denies any pain in toe. No fever , chills , nausea , vomiting , diarrhea or any other ROS. Never had similar symptoms before.     Pt seen bedside by Podiatry in NAD, AAO x 3. Pt admits to pain at the cellulitic site. Pt states it has been getting worse since the weekend. Pt denies any other pedal complaints. Pt denies any current F/V/N/C/SOB. Pt denies any burning numbness, or tingling.     In Ed , BP : 120/ 75 mm hg , HR: 76 , Temp : 98.5 F  Cbc WNL, with left shift , ESR : 21   Cmp WNL   Lactate WNL  Xray negative (20 Jan 2019 21:33)      PMH:Dementia  HTN (hypertension)    Allergies: No Known Allergies    Medications: ampicillin/sulbactam  IVPB 3 Gram(s) IV Intermittent every 6 hours  enoxaparin Injectable 40 milliGRAM(s) SubCutaneous daily  sodium chloride 0.9%. 1000 milliLiter(s) IV Continuous <Continuous>    FH:No pertinent family history in first degree relatives    PSX: Benign breast cyst in female  S/P knee replacement  No significant past surgical history    SH: ampicillin/sulbactam  IVPB 3 Gram(s) IV Intermittent every 6 hours  enoxaparin Injectable 40 milliGRAM(s) SubCutaneous daily  sodium chloride 0.9%. 1000 milliLiter(s) IV Continuous <Continuous>    ICU Vital Signs Last 24 Hrs  T(C): 36.9 (22 Jan 2019 05:01), Max: 37.3 (21 Jan 2019 20:10)  T(F): 98.4 (22 Jan 2019 05:01), Max: 99.1 (21 Jan 2019 20:10)  HR: 65 (22 Jan 2019 05:01) (65 - 90)  BP: 155/35 (22 Jan 2019 05:01) (127/59 - 155/35)  BP(mean): --  ABP: --  ABP(mean): --  RR: 16 (22 Jan 2019 05:01) (16 - 18)  SpO2: 98% (22 Jan 2019 05:01) (98% - 100%)      LABS:                        13.6   6.8   )-----------( 207      ( 22 Jan 2019 07:42 )             40.8   01-22    142  |  109<H>  |  18  ----------------------------<  105<H>  4.0   |  25  |  0.94    Ca    8.8      22 Jan 2019 07:42  Phos  3.3     01-21  Mg     2.0     01-21    TPro  7.5  /  Alb  3.4<L>  /  TBili  0.5  /  DBili  x   /  AST  20  /  ALT  14  /  AlkPhos  99  01-20      ROS  REVIEW OF SYSTEMS:    CONSTITUTIONAL: No fever, weight loss, or fatigue  EYES: No eye pain, visual disturbances, or discharge  ENMT:  No difficulty hearing, tinnitus, vertigo; No sinus or throat pain  NECK: No pain or stiffness  BREASTS: No pain, masses, or nipple discharge  RESPIRATORY: No cough, wheezing, chills or hemoptysis; No shortness of breath  CARDIOVASCULAR: No chest pain, palpitations, dizziness, or leg swelling  GASTROINTESTINAL: No abdominal or epigastric pain. No nausea, vomiting, or hematemesis; No diarrhea or constipation. No melena or hematochezia.  GENITOURINARY: No dysuria, frequency, hematuria, or incontinence  NEUROLOGICAL: No headaches, memory loss, loss of strength, numbness, or tremors  SKIN: No itching, burning, rashes, or lesions   LYMPH NODES: No enlarged glands  ENDOCRINE: No heat or cold intolerance; No hair loss  MUSCULOSKELETAL: No joint pain or swelling; No muscle, back, or extremity pain  PSYCHIATRIC: No depression, anxiety, mood swings, or difficulty sleeping  HEME/LYMPH: No easy bruising, or bleeding gums  ALLERY AND IMMUNOLOGIC: No hives or eczema      PHYSICAL EXAM  GEN: AUGUSTO READ is a pleasant well-nourished, well developed 89y Female in no acute distress, alert awake, and oriented to person, place and time.   LE Focused:    Vasc:  DP/PT nonpalpable; CFT < 3 sec x 10; TG WNL; significant erythema noted to the right fourth digit with erythema tracking proximally to the dorsum of right foot  Derm: Necrotic ulceration noted to the IPJ of the right fourth digit; erythema noted to the fourth right digit tracking proximally to the dorsum of the right foot; no malodor; no drainage; no crepitance or fluctuation; (-) PTB; pain on palpation of the digit   Neuro: Protective sensation grossly diminished     Imaging:   < from: Xray Foot AP + Lateral + Oblique, Right (01.20.19 @ 17:26) >  EXAM:  FOOT RIGHT (MINIMUM 3 VIEWS)                            PROCEDURE DATE:  01/20/2019          INTERPRETATION:  Right foot    HISTORY: Infection      3 views of the right foot show no evidence of acute fracture or cortical   erosion. The jointspaces show hammertoe deformities. Vascular   calcifications are present.    IMPRESSION: No acute bony pathology.    Note - This does not exclude fractures in perfect alignment and   apposition as presence may be indicated at one to three weeks following   callus formation.

## 2019-01-23 ENCOUNTER — TRANSCRIPTION ENCOUNTER (OUTPATIENT)
Age: 84
End: 2019-01-23

## 2019-01-23 VITALS
DIASTOLIC BLOOD PRESSURE: 57 MMHG | HEART RATE: 68 BPM | SYSTOLIC BLOOD PRESSURE: 121 MMHG | TEMPERATURE: 98 F | RESPIRATION RATE: 18 BRPM | OXYGEN SATURATION: 99 %

## 2019-01-23 PROCEDURE — 83036 HEMOGLOBIN GLYCOSYLATED A1C: CPT

## 2019-01-23 PROCEDURE — 82607 VITAMIN B-12: CPT

## 2019-01-23 PROCEDURE — 83735 ASSAY OF MAGNESIUM: CPT

## 2019-01-23 PROCEDURE — 93923 UPR/LXTR ART STDY 3+ LVLS: CPT

## 2019-01-23 PROCEDURE — 93005 ELECTROCARDIOGRAM TRACING: CPT

## 2019-01-23 PROCEDURE — 87086 URINE CULTURE/COLONY COUNT: CPT

## 2019-01-23 PROCEDURE — 83605 ASSAY OF LACTIC ACID: CPT

## 2019-01-23 PROCEDURE — 87040 BLOOD CULTURE FOR BACTERIA: CPT

## 2019-01-23 PROCEDURE — 85027 COMPLETE CBC AUTOMATED: CPT

## 2019-01-23 PROCEDURE — 80061 LIPID PANEL: CPT

## 2019-01-23 PROCEDURE — 85652 RBC SED RATE AUTOMATED: CPT

## 2019-01-23 PROCEDURE — 36415 COLL VENOUS BLD VENIPUNCTURE: CPT

## 2019-01-23 PROCEDURE — 84100 ASSAY OF PHOSPHORUS: CPT

## 2019-01-23 PROCEDURE — 86140 C-REACTIVE PROTEIN: CPT

## 2019-01-23 PROCEDURE — 85610 PROTHROMBIN TIME: CPT

## 2019-01-23 PROCEDURE — 85730 THROMBOPLASTIN TIME PARTIAL: CPT

## 2019-01-23 PROCEDURE — 80053 COMPREHEN METABOLIC PANEL: CPT

## 2019-01-23 PROCEDURE — 82306 VITAMIN D 25 HYDROXY: CPT

## 2019-01-23 PROCEDURE — 99285 EMERGENCY DEPT VISIT HI MDM: CPT | Mod: 25

## 2019-01-23 PROCEDURE — 96374 THER/PROPH/DIAG INJ IV PUSH: CPT

## 2019-01-23 PROCEDURE — 80048 BASIC METABOLIC PNL TOTAL CA: CPT

## 2019-01-23 PROCEDURE — 81001 URINALYSIS AUTO W/SCOPE: CPT

## 2019-01-23 PROCEDURE — 71045 X-RAY EXAM CHEST 1 VIEW: CPT

## 2019-01-23 PROCEDURE — 73630 X-RAY EXAM OF FOOT: CPT

## 2019-01-23 PROCEDURE — 97162 PT EVAL MOD COMPLEX 30 MIN: CPT

## 2019-01-23 PROCEDURE — 84443 ASSAY THYROID STIM HORMONE: CPT

## 2019-01-23 PROCEDURE — 82746 ASSAY OF FOLIC ACID SERUM: CPT

## 2019-01-23 RX ORDER — ACETAMINOPHEN 500 MG
650 TABLET ORAL ONCE
Qty: 0 | Refills: 0 | Status: COMPLETED | OUTPATIENT
Start: 2019-01-23 | End: 2019-01-23

## 2019-01-23 RX ORDER — CEPHALEXIN 500 MG
1 CAPSULE ORAL
Qty: 6 | Refills: 0 | OUTPATIENT
Start: 2019-01-23 | End: 2019-01-25

## 2019-01-23 RX ADMIN — AMPICILLIN SODIUM AND SULBACTAM SODIUM 200 GRAM(S): 250; 125 INJECTION, POWDER, FOR SUSPENSION INTRAMUSCULAR; INTRAVENOUS at 12:27

## 2019-01-23 RX ADMIN — ENOXAPARIN SODIUM 40 MILLIGRAM(S): 100 INJECTION SUBCUTANEOUS at 12:27

## 2019-01-23 RX ADMIN — AMPICILLIN SODIUM AND SULBACTAM SODIUM 200 GRAM(S): 250; 125 INJECTION, POWDER, FOR SUSPENSION INTRAMUSCULAR; INTRAVENOUS at 05:36

## 2019-01-23 RX ADMIN — AMPICILLIN SODIUM AND SULBACTAM SODIUM 200 GRAM(S): 250; 125 INJECTION, POWDER, FOR SUSPENSION INTRAMUSCULAR; INTRAVENOUS at 00:00

## 2019-01-23 NOTE — DISCHARGE NOTE ADULT - PLAN OF CARE
continue antibiotic therapy and schedule close follow up with your PCP You presented with Right lower ext cellulitis requiring IV antibiotics.  Your arterial flow was evaluated by ultrasound and was found to be Please monitor BP and follow up with your PCP You presented with Right lower ext cellulitis requiring IV antibiotics.  Your arterial flow was evaluated by ultrasound and was found to be>>>>>>>>>>>>>>.  Your blood cultures remained neg and you will be discharged on oral keflex x 3 more days with follow up to your PCP. You presented with well controlled BP not on therapy.  Please continue to monitor BP and follow up with your PCP for further management You presented with Right lower ext cellulitis requiring IV antibiotics.  Your arterial flow was evaluated by ultrasound and was found to be normal.  Your blood cultures remained neg and you will be discharged on oral keflex x 3 more days with follow up to your PCP.

## 2019-01-23 NOTE — DISCHARGE NOTE ADULT - PATIENT PORTAL LINK FT
You can access the [facility], offered by [facility], by registering with the following website: [email or web address]

## 2019-01-23 NOTE — DISCHARGE NOTE ADULT - CONDITIONS AT DISCHARGE
D/C home in stable condition Follow up and instructions for discharge meds explained and given son verbalized understanding Left saline lock removed no sign of infection noted Pt left Unit via Wheel chair with transport see flowsheet for VS

## 2019-01-23 NOTE — PROGRESS NOTE ADULT - PROBLEM SELECTOR PLAN 1
Pt evaluated and chart reviewed  Recommend continuation of antibiotics per ID recommendation  X-ray evaluated - no radiographic signs of bone infection  GIA ordered to evaluate blood flow to the feet - normal GIA values  No further work - up per Podiatry standpoint - area of ecchymosis on the fourth digit is most likely traumatic in nature  Pt is clear for discharge per Podiatry standpoint   Discussed with attending Dr. Kaba.

## 2019-01-23 NOTE — DISCHARGE NOTE ADULT - MEDICATION SUMMARY - MEDICATIONS TO STOP TAKING
I will STOP taking the medications listed below when I get home from the hospital:    meclizine 25 mg oral tablet  -- 1 tab(s) by mouth every 8 hours   -- May cause drowsiness.  Alcohol may intensify this effect.  Use care when operating dangerous machinery. I will STOP taking the medications listed below when I get home from the hospital:  None

## 2019-01-23 NOTE — DISCHARGE NOTE ADULT - MEDICATION SUMMARY - MEDICATIONS TO TAKE
I will START or STAY ON the medications listed below when I get home from the hospital:    Keflex 500 mg oral capsule  -- 1 cap(s) by mouth 2 times a day   -- Finish all this medication unless otherwise directed by prescriber.    -- Indication: For Cellulitis

## 2019-01-23 NOTE — PROGRESS NOTE ADULT - SUBJECTIVE AND OBJECTIVE BOX
Patient is a 89y old  Female who presents with a chief complaint of cellulitis  (20 Jan 2019 21:33)      HPI:  89 female with PMH of Dementia , HTN , comes in with complaint of increased redness and swelling of right 4th toe. Patient started having these symptoms on friday, and then it kept increasing. Went to her podiatrist and was then was sent on here as her toe started turning black. Denies any pain in toe. No fever , chills , nausea , vomiting , diarrhea or any other ROS. Never had similar symptoms before.     Pt seen bedside by Podiatry in NAD, AAO x 3. Pt admits to minimal pain at the cellulitic site. Pt denies any other pedal complaints. Pt denies any current F/V/N/C/SOB. Pt denies any burning numbness, or tingling.     In Ed , BP : 120/ 75 mm hg , HR: 76 , Temp : 98.5 F  Cbc WNL, with left shift , ESR : 21   Cmp WNL   Lactate WNL  Xray negative (20 Jan 2019 21:33)      PMH:Dementia  HTN (hypertension)    Allergies: No Known Allergies    Medications: ampicillin/sulbactam  IVPB 3 Gram(s) IV Intermittent every 6 hours  enoxaparin Injectable 40 milliGRAM(s) SubCutaneous daily  sodium chloride 0.9%. 1000 milliLiter(s) IV Continuous <Continuous>    FH:No pertinent family history in first degree relatives    PSX: Benign breast cyst in female  S/P knee replacement  No significant past surgical history    SH: ampicillin/sulbactam  IVPB 3 Gram(s) IV Intermittent every 6 hours  enoxaparin Injectable 40 milliGRAM(s) SubCutaneous daily  sodium chloride 0.9%. 1000 milliLiter(s) IV Continuous <Continuous>    ICU Vital Signs Last 24 Hrs  T(C): 36.9 (22 Jan 2019 05:01), Max: 37.3 (21 Jan 2019 20:10)  T(F): 98.4 (22 Jan 2019 05:01), Max: 99.1 (21 Jan 2019 20:10)  HR: 65 (22 Jan 2019 05:01) (65 - 90)  BP: 155/35 (22 Jan 2019 05:01) (127/59 - 155/35)  BP(mean): --  ABP: --  ABP(mean): --  RR: 16 (22 Jan 2019 05:01) (16 - 18)  SpO2: 98% (22 Jan 2019 05:01) (98% - 100%)      LABS:                          13.6   6.8   )-----------( 207      ( 22 Jan 2019 07:42 )             40.8   01-22    142  |  109<H>  |  18  ----------------------------<  105<H>  4.0   |  25  |  0.94    Ca    8.8      22 Jan 2019 07:42  Phos  3.3     01-21  Mg     2.0     01-21    TPro  7.5  /  Alb  3.4<L>  /  TBili  0.5  /  DBili  x   /  AST  20  /  ALT  14  /  AlkPhos  99  01-20      ROS  REVIEW OF SYSTEMS:    CONSTITUTIONAL: No fever, weight loss, or fatigue  EYES: No eye pain, visual disturbances, or discharge  ENMT:  No difficulty hearing, tinnitus, vertigo; No sinus or throat pain  NECK: No pain or stiffness  BREASTS: No pain, masses, or nipple discharge  RESPIRATORY: No cough, wheezing, chills or hemoptysis; No shortness of breath  CARDIOVASCULAR: No chest pain, palpitations, dizziness, or leg swelling  GASTROINTESTINAL: No abdominal or epigastric pain. No nausea, vomiting, or hematemesis; No diarrhea or constipation. No melena or hematochezia.  GENITOURINARY: No dysuria, frequency, hematuria, or incontinence  NEUROLOGICAL: No headaches, memory loss, loss of strength, numbness, or tremors  SKIN: No itching, burning, rashes, or lesions   LYMPH NODES: No enlarged glands  ENDOCRINE: No heat or cold intolerance; No hair loss  MUSCULOSKELETAL: No joint pain or swelling; No muscle, back, or extremity pain  PSYCHIATRIC: No depression, anxiety, mood swings, or difficulty sleeping  HEME/LYMPH: No easy bruising, or bleeding gums  ALLERY AND IMMUNOLOGIC: No hives or eczema      PHYSICAL EXAM  GEN: AUGUSTO READ is a pleasant well-nourished, well developed 89y Female in no acute distress, alert awake, and oriented to person, place and time.   LE Focused:    Vasc:  DP/PT nonpalpable; CFT < 3 sec x 10; TG WNL; significant erythema noted to the right fourth digit with erythema tracking proximally to the dorsum of right foot  Derm: Necrotic ulceration noted to the IPJ of the right fourth digit; erythema noted to the fourth right digit tracking proximally to the dorsum of the right foot; no malodor; no drainage; no crepitance or fluctuation; (-) PTB; pain on palpation of the digit   Neuro: Protective sensation grossly diminished     Imaging:   < from: Xray Foot AP + Lateral + Oblique, Right (01.20.19 @ 17:26) >  EXAM:  FOOT RIGHT (MINIMUM 3 VIEWS)                            PROCEDURE DATE:  01/20/2019          INTERPRETATION:  Right foot    HISTORY: Infection      3 views of the right foot show no evidence of acute fracture or cortical   erosion. The jointspaces show hammertoe deformities. Vascular   calcifications are present.    IMPRESSION: No acute bony pathology.    Note - This does not exclude fractures in perfect alignment and   apposition as presence may be indicated at one to three weeks following   callus formation.      -------------------------------------------------------------------    < from: VA Physiol Extremity Lower 3+ Level, BI (01.22.19 @ 16:57) >      PROCEDURE DATE:  01/22/2019          INTERPRETATION:  Clinical Information: Peripheral vascular disease    Technique: Bilateral lower extremity ABIs/PVR    Comparison: None    Findings:  Right lower extremity: The ankle brachial index is 1.33. The pulse   waveforms are intact.    Left lower extremity: The ankle brachial index is 1.24. The pulse   waveforms are intact.    Impression: Normal bilateral GIA, however there is lower extremity   hypertension which may represent the presence of atherosclerotic disease.    < end of copied text >

## 2019-01-23 NOTE — DISCHARGE NOTE ADULT - HOSPITAL COURSE
89 female with PMH of Dementia , HTN ( not on meds) , comes in with complaint of increased redness and swelling of right 4th toe. Patient started having these symptoms on friday, and then it kept increasing. Went to her podiatrist and was then was sent on here as her toe started turning black. Denies any pain in toe.     In Ed , BP : 120/ 75 mm hg , HR: 76 , Temp : 98.5 F  Cbc WNL, with left shift , ESR : 21   Cmp WNL   Lactate WNL  Xray negative    Will admit to medicine for Cellulitis.   Pt clinically improved on IV Unasyn  evaluated by podiatry- no surgical indications  GIA's     Blood cultures remained negative   Pt currently medically stable for discharge with instruction to follow up with his PCP 89 female with PMH of Dementia , HTN ( not on meds) , comes in with complaint of increased redness and swelling of right 4th toe. Patient started having these symptoms on friday, and then it kept increasing. Went to her podiatrist and was then was sent on here as her toe started turning black. Denies any pain in toe.     In Ed , BP : 120/ 75 mm hg , HR: 76 , Temp : 98.5 F  Cbc WNL, with left shift , ESR : 21   Cmp WNL   Lactate WNL  Xray negative    Will admit to medicine for Cellulitis.   Pt clinically improved on IV Unasyn  evaluated by podiatry- no surgical indications  GIA's WNL    Blood cultures remained negative   Pt currently medically stable for discharge with instruction to follow up with his PCP

## 2019-01-25 LAB
CULTURE RESULTS: SIGNIFICANT CHANGE UP
CULTURE RESULTS: SIGNIFICANT CHANGE UP
SPECIMEN SOURCE: SIGNIFICANT CHANGE UP
SPECIMEN SOURCE: SIGNIFICANT CHANGE UP

## 2022-06-27 PROBLEM — F03.90 UNSPECIFIED DEMENTIA WITHOUT BEHAVIORAL DISTURBANCE: Chronic | Status: ACTIVE | Noted: 2019-01-20

## 2022-06-27 PROBLEM — I10 ESSENTIAL (PRIMARY) HYPERTENSION: Chronic | Status: ACTIVE | Noted: 2019-01-20

## 2022-07-22 ENCOUNTER — APPOINTMENT (OUTPATIENT)
Dept: CARDIOLOGY | Facility: CLINIC | Age: 87
End: 2022-07-22

## 2022-07-22 ENCOUNTER — NON-APPOINTMENT (OUTPATIENT)
Age: 87
End: 2022-07-22

## 2022-07-22 VITALS
SYSTOLIC BLOOD PRESSURE: 114 MMHG | DIASTOLIC BLOOD PRESSURE: 60 MMHG | WEIGHT: 139 LBS | BODY MASS INDEX: 23.16 KG/M2 | OXYGEN SATURATION: 95 % | HEART RATE: 75 BPM | HEIGHT: 65 IN | TEMPERATURE: 97.2 F

## 2022-07-22 DIAGNOSIS — J34.2 DEVIATED NASAL SEPTUM: ICD-10-CM

## 2022-07-22 DIAGNOSIS — Z80.9 FAMILY HISTORY OF MALIGNANT NEOPLASM, UNSPECIFIED: ICD-10-CM

## 2022-07-22 DIAGNOSIS — Z78.9 OTHER SPECIFIED HEALTH STATUS: ICD-10-CM

## 2022-07-22 DIAGNOSIS — E86.0 DEHYDRATION: ICD-10-CM

## 2022-07-22 DIAGNOSIS — R01.1 CARDIAC MURMUR, UNSPECIFIED: ICD-10-CM

## 2022-07-22 DIAGNOSIS — Z00.00 ENCOUNTER FOR GENERAL ADULT MEDICAL EXAMINATION W/OUT ABNORMAL FINDINGS: ICD-10-CM

## 2022-07-22 PROCEDURE — 93000 ELECTROCARDIOGRAM COMPLETE: CPT

## 2022-07-22 PROCEDURE — 99204 OFFICE O/P NEW MOD 45 MIN: CPT

## 2022-07-22 RX ORDER — FAMOTIDINE 10 MG/1
10 TABLET, FILM COATED ORAL
Refills: 0 | Status: ACTIVE | COMMUNITY

## 2022-07-22 RX ORDER — MV-MIN/FOLIC/VIT K/LYCOP/COQ10 200-100MCG
CAPSULE ORAL
Refills: 0 | Status: ACTIVE | COMMUNITY

## 2022-07-22 RX ORDER — PNV NO.95/FERROUS FUM/FOLIC AC 28MG-0.8MG
TABLET ORAL
Refills: 0 | Status: ACTIVE | COMMUNITY

## 2022-07-22 NOTE — HISTORY OF PRESENT ILLNESS
[FreeTextEntry1] : She has no chest pain\par She has no shortness of breath\par She has no palpitations\par She has no syncope\par She is probably dementia\par She has no edema\par She has no skin rashes\par

## 2022-07-22 NOTE — REASON FOR VISIT
[Arrhythmia/ECG Abnorrmalities] : arrhythmia/ECG abnormalities [FreeTextEntry1] : I saw this 89-year-old woman in cardiac consultation on  07/22/22\par She is nonverbal, and there may be some dementia, and the history was from her son.  She has no ongoing cardiac issues.  She did have issues with cellulitis of her lower limbs, but this has been resolved.\par She was told that she has a heart murmur\par Her EKG shows sinus rhythm with Wenckebach phenomena and nonspecific T wave changes

## 2022-07-22 NOTE — DISCUSSION/SUMMARY
[FreeTextEntry1] : I gave the son prescriptions for routine blood work and encouraged him to see a primary care doctor

## 2022-07-22 NOTE — PHYSICAL EXAM
[No Acute Distress] : no acute distress [Frail] : frail [Normal Conjunctiva] : normal conjunctiva [Normal Venous Pressure] : normal venous pressure [No Carotid Bruit] : no carotid bruit [Normal S1, S2] : normal S1, S2 [No Murmur] : no murmur [No Rub] : no rub [No Gallop] : no gallop [Clear Lung Fields] : clear lung fields [Good Air Entry] : good air entry [No Respiratory Distress] : no respiratory distress  [Soft] : abdomen soft [Non Tender] : non-tender [No Masses/organomegaly] : no masses/organomegaly [Normal Bowel Sounds] : normal bowel sounds [Normal] : normal gait [No Edema] : no edema [No Cyanosis] : no cyanosis [No Clubbing] : no clubbing [No Varicosities] : no varicosities [No Rash] : no rash [No Skin Lesions] : no skin lesions [Moves all extremities] : moves all extremities [No Focal Deficits] : no focal deficits [Alert and Oriented] : alert and oriented [Normal memory] : normal memory [de-identified] : wheel chair [de-identified] : In a wheel chair. Non verbal [de-identified] : May have dementia

## 2022-08-01 ENCOUNTER — NON-APPOINTMENT (OUTPATIENT)
Age: 87
End: 2022-08-01

## 2023-05-05 NOTE — DISCHARGE NOTE ADULT - CARE PLAN
none Principal Discharge DX:	Cellulitis  Goal:	continue antibiotic therapy and schedule close follow up with your PCP  Assessment and plan of treatment:	You presented with Right lower ext cellulitis requiring IV antibiotics.  Your arterial flow was evaluated by ultrasound and was found to be  Secondary Diagnosis:	HTN (hypertension)  Goal:	Please monitor BP and follow up with your PCP Principal Discharge DX:	Cellulitis  Goal:	continue antibiotic therapy and schedule close follow up with your PCP  Assessment and plan of treatment:	You presented with Right lower ext cellulitis requiring IV antibiotics.  Your arterial flow was evaluated by ultrasound and was found to be>>>>>>>>>>>>>>.  Your blood cultures remained neg and you will be discharged on oral keflex x 3 more days with follow up to your PCP.  Secondary Diagnosis:	HTN (hypertension)  Goal:	Please monitor BP and follow up with your PCP  Assessment and plan of treatment:	You presented with well controlled BP not on therapy.  Please continue to monitor BP and follow up with your PCP for further management Principal Discharge DX:	Cellulitis  Goal:	continue antibiotic therapy and schedule close follow up with your PCP  Assessment and plan of treatment:	You presented with Right lower ext cellulitis requiring IV antibiotics.  Your arterial flow was evaluated by ultrasound and was found to be normal.  Your blood cultures remained neg and you will be discharged on oral keflex x 3 more days with follow up to your PCP.  Secondary Diagnosis:	HTN (hypertension)  Goal:	Please monitor BP and follow up with your PCP  Assessment and plan of treatment:	You presented with well controlled BP not on therapy.  Please continue to monitor BP and follow up with your PCP for further management

## 2023-05-18 ENCOUNTER — APPOINTMENT (OUTPATIENT)
Dept: NEPHROLOGY | Facility: CLINIC | Age: 88
End: 2023-05-18

## 2023-08-11 ENCOUNTER — APPOINTMENT (OUTPATIENT)
Dept: CARDIOLOGY | Facility: CLINIC | Age: 88
End: 2023-08-11
Payer: MEDICARE

## 2023-08-11 VITALS
BODY MASS INDEX: 23.32 KG/M2 | OXYGEN SATURATION: 95 % | WEIGHT: 140 LBS | DIASTOLIC BLOOD PRESSURE: 68 MMHG | HEART RATE: 63 BPM | SYSTOLIC BLOOD PRESSURE: 92 MMHG | HEIGHT: 65 IN

## 2023-08-11 DIAGNOSIS — I44.1 ATRIOVENTRICULAR BLOCK, SECOND DEGREE: ICD-10-CM

## 2023-08-11 DIAGNOSIS — E78.5 HYPERLIPIDEMIA, UNSPECIFIED: ICD-10-CM

## 2023-08-11 DIAGNOSIS — Z91.89 OTHER SPECIFIED PERSONAL RISK FACTORS, NOT ELSEWHERE CLASSIFIED: ICD-10-CM

## 2023-08-11 PROCEDURE — 99215 OFFICE O/P EST HI 40 MIN: CPT

## 2023-08-11 PROCEDURE — 93000 ELECTROCARDIOGRAM COMPLETE: CPT

## 2023-08-11 RX ORDER — CLOPIDOGREL BISULFATE 75 MG/1
75 TABLET, FILM COATED ORAL
Refills: 0 | Status: DISCONTINUED | COMMUNITY
End: 2023-08-11

## 2023-08-11 NOTE — PHYSICAL EXAM
[No Acute Distress] : no acute distress [Frail] : frail [Normal Conjunctiva] : normal conjunctiva [Normal Venous Pressure] : normal venous pressure [No Carotid Bruit] : no carotid bruit [Normal S1, S2] : normal S1, S2 [No Murmur] : no murmur [No Rub] : no rub [No Gallop] : no gallop [Clear Lung Fields] : clear lung fields [Good Air Entry] : good air entry [No Respiratory Distress] : no respiratory distress  [Soft] : abdomen soft [Non Tender] : non-tender [No Masses/organomegaly] : no masses/organomegaly [Normal Bowel Sounds] : normal bowel sounds [Normal] : normal gait [No Edema] : no edema [No Cyanosis] : no cyanosis [No Clubbing] : no clubbing [No Varicosities] : no varicosities [No Rash] : no rash [No Skin Lesions] : no skin lesions [Moves all extremities] : moves all extremities [No Focal Deficits] : no focal deficits [Alert and Oriented] : alert and oriented [Normal memory] : normal memory [de-identified] : In a wheel chair. Non verbal [de-identified] : wheel chair [de-identified] : May have dementia

## 2023-08-11 NOTE — DISCUSSION/SUMMARY
[FreeTextEntry1] : I reassured the daughter that she did not need amlodipine as her blood pressure was low.  There was no need for any testing at this point and I will see her again in 6 months. [EKG obtained to assist in diagnosis and management of assessed problem(s)] : EKG obtained to assist in diagnosis and management of assessed problem(s)

## 2023-08-11 NOTE — REASON FOR VISIT
[Arrhythmia/ECG Abnorrmalities] : arrhythmia/ECG abnormalities [FreeTextEntry1] : I saw this 89-year-old woman in f/u cardiac consultation on  08/11/23 She is nonverbal, and there is dementia, and the history was from her daughter.  She has no ongoing cardiac issues.  She did have issues with cellulitis of her lower limbs, but this has been resolved. She was told that she has a heart murmur. 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

## 2023-10-11 ENCOUNTER — APPOINTMENT (OUTPATIENT)
Dept: UROLOGY | Facility: CLINIC | Age: 88
End: 2023-10-11
Payer: MEDICARE

## 2023-10-11 ENCOUNTER — NON-APPOINTMENT (OUTPATIENT)
Age: 88
End: 2023-10-11

## 2023-10-11 VITALS
HEIGHT: 65 IN | SYSTOLIC BLOOD PRESSURE: 158 MMHG | BODY MASS INDEX: 23.32 KG/M2 | TEMPERATURE: 97.9 F | WEIGHT: 140 LBS | HEART RATE: 66 BPM | DIASTOLIC BLOOD PRESSURE: 83 MMHG

## 2023-10-11 DIAGNOSIS — C67.9 MALIGNANT NEOPLASM OF BLADDER, UNSPECIFIED: ICD-10-CM

## 2023-10-11 DIAGNOSIS — R31.0 GROSS HEMATURIA: ICD-10-CM

## 2023-10-11 PROCEDURE — 99203 OFFICE O/P NEW LOW 30 MIN: CPT | Mod: 25

## 2023-10-11 PROCEDURE — 51701 INSERT BLADDER CATHETER: CPT

## 2023-10-11 RX ORDER — AMLODIPINE BESYLATE 2.5 MG/1
2.5 TABLET ORAL DAILY
Refills: 0 | Status: ACTIVE | COMMUNITY

## 2023-10-12 LAB
APPEARANCE: CLEAR
BACTERIA: NEGATIVE /HPF
BILIRUBIN URINE: NEGATIVE
BLOOD URINE: NEGATIVE
CAST: 0 /LPF
COLOR: YELLOW
EPITHELIAL CELLS: 0 /HPF
GLUCOSE QUALITATIVE U: NEGATIVE MG/DL
KETONES URINE: NEGATIVE MG/DL
LEUKOCYTE ESTERASE URINE: NEGATIVE
MICROSCOPIC-UA: NORMAL
NITRITE URINE: NEGATIVE
PH URINE: 6
PROTEIN URINE: NEGATIVE MG/DL
RED BLOOD CELLS URINE: 2 /HPF
SPECIFIC GRAVITY URINE: 1.01
URINE CYTOLOGY: NORMAL
UROBILINOGEN URINE: 0.2 MG/DL
WHITE BLOOD CELLS URINE: 0 /HPF

## 2023-10-13 LAB — BACTERIA UR CULT: NORMAL

## 2023-10-31 ENCOUNTER — APPOINTMENT (OUTPATIENT)
Dept: UROLOGY | Facility: CLINIC | Age: 88
End: 2023-10-31

## 2023-12-15 ENCOUNTER — APPOINTMENT (OUTPATIENT)
Dept: CARDIOLOGY | Facility: CLINIC | Age: 88
End: 2023-12-15